# Patient Record
Sex: FEMALE | Race: WHITE | Employment: OTHER | URBAN - NONMETROPOLITAN AREA
[De-identification: names, ages, dates, MRNs, and addresses within clinical notes are randomized per-mention and may not be internally consistent; named-entity substitution may affect disease eponyms.]

---

## 2022-05-10 ENCOUNTER — OFFICE VISIT (OUTPATIENT)
Dept: CARDIOLOGY CLINIC | Age: 62
End: 2022-05-10
Payer: COMMERCIAL

## 2022-05-10 VITALS
HEIGHT: 64 IN | BODY MASS INDEX: 23.42 KG/M2 | SYSTOLIC BLOOD PRESSURE: 142 MMHG | DIASTOLIC BLOOD PRESSURE: 70 MMHG | HEART RATE: 80 BPM | WEIGHT: 137.2 LBS

## 2022-05-10 DIAGNOSIS — I10 PRIMARY HYPERTENSION: ICD-10-CM

## 2022-05-10 DIAGNOSIS — E78.01 FAMILIAL HYPERCHOLESTEROLEMIA: ICD-10-CM

## 2022-05-10 DIAGNOSIS — R06.02 SHORTNESS OF BREATH: Primary | ICD-10-CM

## 2022-05-10 PROCEDURE — 99204 OFFICE O/P NEW MOD 45 MIN: CPT | Performed by: NUCLEAR MEDICINE

## 2022-05-10 PROCEDURE — G8420 CALC BMI NORM PARAMETERS: HCPCS | Performed by: NUCLEAR MEDICINE

## 2022-05-10 PROCEDURE — G8427 DOCREV CUR MEDS BY ELIG CLIN: HCPCS | Performed by: NUCLEAR MEDICINE

## 2022-05-10 PROCEDURE — 3017F COLORECTAL CA SCREEN DOC REV: CPT | Performed by: NUCLEAR MEDICINE

## 2022-05-10 PROCEDURE — 4004F PT TOBACCO SCREEN RCVD TLK: CPT | Performed by: NUCLEAR MEDICINE

## 2022-05-10 RX ORDER — ROSUVASTATIN CALCIUM 20 MG/1
TABLET, COATED ORAL
COMMUNITY
Start: 2022-04-06

## 2022-05-10 RX ORDER — OMEGA-3-ACID ETHYL ESTERS 1 G/1
CAPSULE, LIQUID FILLED ORAL
COMMUNITY
Start: 2022-04-07

## 2022-05-10 RX ORDER — MELOXICAM 15 MG/1
TABLET ORAL
Status: ON HOLD | COMMUNITY
Start: 2022-04-06 | End: 2022-05-23 | Stop reason: HOSPADM

## 2022-05-10 RX ORDER — ASPIRIN 81 MG/1
TABLET ORAL
COMMUNITY

## 2022-05-10 RX ORDER — UBIDECARENONE 100 MG
CAPSULE ORAL
COMMUNITY

## 2022-05-10 ASSESSMENT — ENCOUNTER SYMPTOMS
CHEST TIGHTNESS: 0
SHORTNESS OF BREATH: 1
NAUSEA: 0
VOMITING: 0
ABDOMINAL DISTENTION: 0
COLOR CHANGE: 0
PHOTOPHOBIA: 0
ANAL BLEEDING: 0
ABDOMINAL PAIN: 0
BACK PAIN: 0
RECTAL PAIN: 0
BLOOD IN STOOL: 0
DIARRHEA: 0
CONSTIPATION: 0

## 2022-05-10 NOTE — PROGRESS NOTES
[address] [address] [address] [facility],4[address] [address] [facility]  Dept: [phone]  Dept Fax: [phone]  Loc: [phone]    Visit Date: 5/10/2022    Sherin Jimenes is a 59 y.o. female who presents todayfor:  Chief Complaint   Patient presents with    Hypertension    Hyperlipidemia    Shortness of Breath   here for the first time  Seen PCP   Was told had a murmur   Going for carotid disease surgery by Georgina Contreras  Had a stress echo   Was sub optimal   Some wall motion abnormality at baseline  No known CAD before  She is smoker  Limited by her likely COPD  Does have HTN and seems under control   Does have hyperlipidemia  On meds for a while  No chest pain per se  Some baseline dyspnea  Dyspnea on exertion   Still smoking  Family history of CAD       HPI:  HPI  History reviewed. No pertinent past medical history. History reviewed. No pertinent surgical history. History reviewed. No pertinent family history.   Social History     Tobacco Use    Smoking status: Current Some Day Smoker    Smokeless tobacco: Never Used   Substance Use Topics    Alcohol use: Not on file      Current Outpatient Medications   Medication Sig Dispense Refill    meloxicam (MOBIC) 15 MG tablet take 1 tablet by mouth once daily      aspirin 81 MG EC tablet Take by mouth      Calcium Carbonate-Vitamin D (CALCIUM-VITAMIN D) 600-125 MG-UNIT TABS Take by mouth      coenzyme Q10 100 MG CAPS capsule Take by mouth      VENTOLIN  (90 Base) MCG/ACT inhaler inhale 2 puffs by mouth and INTO THE LUNGS four times a day if needed for shortness of breath      metoprolol tartrate (LOPRESSOR) 25 MG tablet take 1 tablet by mouth twice a day      nicotine (NICODERM CQ) 7 MG/24HR APPLY 1 PATCH TO DRY, CLEAN, INTACT SKIN REMOVE AND REPLACE DAILY      omega-3 acid ethyl esters (LOVAZA) 1 g capsule take 2 capsules by mouth twice a day      rosuvastatin (CRESTOR) 20 MG tablet take 1 tablet by mouth once daily       No current facility-administered medications for this visit. No Known Allergies  Health Maintenance   Topic Date Due    Annual Wellness Visit (AWV)  Never done    Lipids  Never done    Depression Screen  Never done    HIV screen  Never done    Hepatitis C screen  Never done    DTaP/Tdap/Td vaccine (1 - Tdap) Never done    Cervical cancer screen  Never done    Colorectal Cancer Screen  Never done    Breast cancer screen  Never done    Shingles vaccine (1 of 2) Never done    COVID-19 Vaccine (3 - Booster for Pfizer series) 01/01/2022    Flu vaccine (Season Ended) 09/01/2022    Hepatitis A vaccine  Aged Out    Hepatitis B vaccine  Aged Out    Hib vaccine  Aged Out    Meningococcal (ACWY) vaccine  Aged Out    Pneumococcal 0-64 years Vaccine  Aged Out       Subjective:  Review of Systems   Constitutional: Positive for fatigue. HENT: Negative for ear pain and mouth sores. Eyes: Negative for photophobia. Respiratory: Positive for shortness of breath. Negative for chest tightness. Cardiovascular: Negative for chest pain and palpitations. Gastrointestinal: Negative for abdominal distention, abdominal pain, anal bleeding, blood in stool, constipation, diarrhea, nausea, rectal pain and vomiting. Endocrine: Negative for polyphagia. Genitourinary: Negative for dysuria, frequency and urgency. Musculoskeletal: Negative for arthralgias, back pain, gait problem, joint swelling, myalgias, neck pain and neck stiffness. Skin: Negative for color change, pallor, rash and wound. Allergic/Immunologic: Negative for food allergies. Neurological: Negative for dizziness, syncope and light-headedness. Psychiatric/Behavioral: Negative for confusion, decreased concentration, dysphoric mood and hallucinations. The patient is not nervous/anxious and is not hyperactive. Objective:  Physical Exam  HENT:      Head: Normocephalic and atraumatic.       Right Ear: Tympanic membrane normal.      Nose: Nose normal.      Mouth/Throat:      Mouth: Mucous membranes are dry. Eyes:      Extraocular Movements: Extraocular movements intact. Pupils: Pupils are equal, round, and reactive to light. Cardiovascular:      Rate and Rhythm: Normal rate and regular rhythm. Heart sounds: Murmur heard. No gallop. Pulmonary:      Effort: No respiratory distress. Breath sounds: No stridor. No wheezing, rhonchi or rales. Chest:      Chest wall: No tenderness. Abdominal:      General: There is no distension. Palpations: There is no mass. Tenderness: There is no abdominal tenderness. There is no right CVA tenderness, left CVA tenderness, guarding or rebound. Hernia: No hernia is present. Musculoskeletal:         General: No swelling, tenderness, deformity or signs of injury. Cervical back: Normal range of motion. Right lower leg: No edema. Left lower leg: No edema. Skin:     Coloration: Skin is not jaundiced or pale. Findings: No bruising, erythema, lesion or rash. Neurological:      Mental Status: She is alert and oriented to person, place, and time. Cranial Nerves: No cranial nerve deficit. Sensory: No sensory deficit. Motor: No weakness. Coordination: Coordination normal.      Gait: Gait normal.   Psychiatric:         Mood and Affect: Mood normal.         Thought Content: Thought content normal.       BP (!) 142/70   Pulse 80   Ht 5' 4\" (1.626 m)   Wt 137 lb 3.2 oz (62.2 kg)   BMI 23.55 kg/m²     Assessment:      Diagnosis Orders   1. Shortness of breath     2. Primary hypertension     3. Familial hypercholesterolemia     as above  Higher risk patient   Does have Carotid disease  Planning CEA soon       Plan:  No follow-ups on file. Discussed  Cath vs nuclear stress test discussed  Smoking: discussed with the patient the importance of smoke cessation especially with the risk of CAD.   Continue risk factor modification and medical management  Thank you for allowing me to participate in the care of your patient. Please don't hesitate to contact me regarding any further issues related to the patient care    Orders Placed:  No orders of the defined types were placed in this encounter. Medications Prescribed:  No orders of the defined types were placed in this encounter. Discussed use, benefit, and side effects of prescribed medications. All patient questions answered. Pt voicedunderstanding. Instructed to continue current medications, diet and exercise. Continue risk factor modification and medical management. Patient agreed with treatment plan. Follow up as directed.     Electronically signedby Armida Santacruz MD on 5/10/2022 at 1:16 PM

## 2022-05-11 ENCOUNTER — TELEPHONE (OUTPATIENT)
Dept: CARDIOLOGY CLINIC | Age: 62
End: 2022-05-11

## 2022-05-11 NOTE — TELEPHONE ENCOUNTER
Spoke with patient to schedule left heart cath on 5/23/22 at 2:00 pm, with arrival time of 12:00 pm; patient may take all medications as usual on day of procedure. All instructions reviewed via phone with patient and also mailed to patient this date. Patient verbalized understanding of all instructions. Case scheduled with Karthik Arrieta in cath lab.

## 2022-05-18 NOTE — PRE-PROCEDURE INSTRUCTIONS
[facility]nd [facility]  NPO after midnight  Bring drivers license and insurance information  Wear comfortable clean clothes  Shower morning of and night before with liquid antibacterial soap  Remove jewelry   May have to stay overnight if have PTCA/stent - bring small overnight bag  Bring medications in original bottles - may take am meds with small amount of water. Made aware of visitors limit to 2 at a time  Follow all instructions given by your physician  Please notify doctor office if you need to cancel or reschedule your procedure   needed at discharge  Bring and wear your mask.   Leave valuables at home

## 2022-05-20 ENCOUNTER — PREP FOR PROCEDURE (OUTPATIENT)
Dept: CARDIOLOGY | Age: 62
End: 2022-05-20

## 2022-05-20 RX ORDER — DIPHENHYDRAMINE HYDROCHLORIDE 50 MG/ML
50 INJECTION INTRAMUSCULAR; INTRAVENOUS ONCE
Status: CANCELLED | OUTPATIENT
Start: 2022-05-20 | End: 2022-05-20

## 2022-05-20 RX ORDER — NITROGLYCERIN 0.4 MG/1
0.4 TABLET SUBLINGUAL EVERY 5 MIN PRN
Status: CANCELLED | OUTPATIENT
Start: 2022-05-20

## 2022-05-20 RX ORDER — SODIUM CHLORIDE 9 MG/ML
INJECTION, SOLUTION INTRAVENOUS PRN
Status: CANCELLED | OUTPATIENT
Start: 2022-05-20

## 2022-05-20 RX ORDER — ASPIRIN 325 MG
325 TABLET ORAL ONCE
Status: CANCELLED | OUTPATIENT
Start: 2022-05-20 | End: 2022-05-20

## 2022-05-20 RX ORDER — SODIUM CHLORIDE 0.9 % (FLUSH) 0.9 %
5-40 SYRINGE (ML) INJECTION PRN
Status: CANCELLED | OUTPATIENT
Start: 2022-05-20

## 2022-05-20 RX ORDER — SODIUM CHLORIDE 0.9 % (FLUSH) 0.9 %
5-40 SYRINGE (ML) INJECTION EVERY 12 HOURS SCHEDULED
Status: CANCELLED | OUTPATIENT
Start: 2022-05-20

## 2022-05-23 ENCOUNTER — TELEPHONE (OUTPATIENT)
Dept: CARDIOLOGY CLINIC | Age: 62
End: 2022-05-23

## 2022-05-23 ENCOUNTER — HOSPITAL ENCOUNTER (OUTPATIENT)
Dept: INPATIENT UNIT | Age: 62
Discharge: HOME | End: 2022-05-23
Attending: NUCLEAR MEDICINE | Admitting: NUCLEAR MEDICINE
Payer: COMMERCIAL

## 2022-05-23 VITALS
HEIGHT: 64 IN | DIASTOLIC BLOOD PRESSURE: 61 MMHG | RESPIRATION RATE: 16 BRPM | SYSTOLIC BLOOD PRESSURE: 138 MMHG | OXYGEN SATURATION: 95 % | WEIGHT: 132 LBS | TEMPERATURE: 97.5 F | HEART RATE: 73 BPM | BODY MASS INDEX: 22.53 KG/M2

## 2022-05-23 LAB
ABO: NORMAL
ACTIVATED CLOTTING TIME: 291 SECONDS (ref 1–150)
ANION GAP SERPL CALCULATED.3IONS-SCNC: 13 MEQ/L (ref 8–16)
ANTIBODY SCREEN: NORMAL
APTT: 37.9 SECONDS (ref 22–38)
BUN BLDV-MCNC: 14 MG/DL (ref 7–22)
CALCIUM SERPL-MCNC: 10 MG/DL (ref 8.5–10.5)
CHLORIDE BLD-SCNC: 101 MEQ/L (ref 98–111)
CO2: 26 MEQ/L (ref 23–33)
CREAT SERPL-MCNC: 0.6 MG/DL (ref 0.4–1.2)
EKG ATRIAL RATE: 63 BPM
EKG ATRIAL RATE: 67 BPM
EKG P AXIS: 68 DEGREES
EKG P AXIS: 71 DEGREES
EKG P-R INTERVAL: 176 MS
EKG P-R INTERVAL: 178 MS
EKG Q-T INTERVAL: 434 MS
EKG Q-T INTERVAL: 486 MS
EKG QRS DURATION: 84 MS
EKG QRS DURATION: 90 MS
EKG QTC CALCULATION (BAZETT): 458 MS
EKG QTC CALCULATION (BAZETT): 497 MS
EKG R AXIS: 51 DEGREES
EKG R AXIS: 69 DEGREES
EKG T AXIS: 61 DEGREES
EKG T AXIS: 66 DEGREES
EKG VENTRICULAR RATE: 63 BPM
EKG VENTRICULAR RATE: 67 BPM
ERYTHROCYTE [DISTWIDTH] IN BLOOD BY AUTOMATED COUNT: 13.6 % (ref 11.5–14.5)
ERYTHROCYTE [DISTWIDTH] IN BLOOD BY AUTOMATED COUNT: 46.5 FL (ref 35–45)
GFR SERPL CREATININE-BSD FRML MDRD: > 90 ML/MIN/1.73M2
GLUCOSE BLD-MCNC: 94 MG/DL (ref 70–108)
HCT VFR BLD CALC: 45.2 % (ref 37–47)
HEMOGLOBIN: 14.4 GM/DL (ref 12–16)
INR BLD: 0.98 (ref 0.85–1.13)
MCH RBC QN AUTO: 29.3 PG (ref 26–33)
MCHC RBC AUTO-ENTMCNC: 31.9 GM/DL (ref 32.2–35.5)
MCV RBC AUTO: 92.1 FL (ref 81–99)
PLATELET # BLD: 212 THOU/MM3 (ref 130–400)
PMV BLD AUTO: 10.8 FL (ref 9.4–12.4)
POTASSIUM SERPL-SCNC: 4.2 MEQ/L (ref 3.5–5.2)
PREGNANCY, SERUM: NEGATIVE
RBC # BLD: 4.91 MILL/MM3 (ref 4.2–5.4)
RH FACTOR: NORMAL
SODIUM BLD-SCNC: 140 MEQ/L (ref 135–145)
WBC # BLD: 11.2 THOU/MM3 (ref 4.8–10.8)

## 2022-05-23 PROCEDURE — 2500000003 HC RX 250 WO HCPCS

## 2022-05-23 PROCEDURE — 93005 ELECTROCARDIOGRAM TRACING: CPT | Performed by: INTERNAL MEDICINE

## 2022-05-23 PROCEDURE — 93005 ELECTROCARDIOGRAM TRACING: CPT | Performed by: STUDENT IN AN ORGANIZED HEALTH CARE EDUCATION/TRAINING PROGRAM

## 2022-05-23 PROCEDURE — 85730 THROMBOPLASTIN TIME PARTIAL: CPT

## 2022-05-23 PROCEDURE — 85347 COAGULATION TIME ACTIVATED: CPT

## 2022-05-23 PROCEDURE — 86850 RBC ANTIBODY SCREEN: CPT

## 2022-05-23 PROCEDURE — 86901 BLOOD TYPING SEROLOGIC RH(D): CPT

## 2022-05-23 PROCEDURE — 93458 L HRT ARTERY/VENTRICLE ANGIO: CPT | Performed by: INTERNAL MEDICINE

## 2022-05-23 PROCEDURE — 84703 CHORIONIC GONADOTROPIN ASSAY: CPT

## 2022-05-23 PROCEDURE — C1887 CATHETER, GUIDING: HCPCS

## 2022-05-23 PROCEDURE — 85027 COMPLETE CBC AUTOMATED: CPT

## 2022-05-23 PROCEDURE — 6360000004 HC RX CONTRAST MEDICATION: Performed by: NUCLEAR MEDICINE

## 2022-05-23 PROCEDURE — 86900 BLOOD TYPING SEROLOGIC ABO: CPT

## 2022-05-23 PROCEDURE — 93458 L HRT ARTERY/VENTRICLE ANGIO: CPT

## 2022-05-23 PROCEDURE — 36415 COLL VENOUS BLD VENIPUNCTURE: CPT

## 2022-05-23 PROCEDURE — 93571 IV DOP VEL&/PRESS C FLO 1ST: CPT

## 2022-05-23 PROCEDURE — C1769 GUIDE WIRE: HCPCS

## 2022-05-23 PROCEDURE — 6360000002 HC RX W HCPCS

## 2022-05-23 PROCEDURE — 80048 BASIC METABOLIC PNL TOTAL CA: CPT

## 2022-05-23 PROCEDURE — C1894 INTRO/SHEATH, NON-LASER: HCPCS

## 2022-05-23 PROCEDURE — 93571 IV DOP VEL&/PRESS C FLO 1ST: CPT | Performed by: INTERNAL MEDICINE

## 2022-05-23 PROCEDURE — 85610 PROTHROMBIN TIME: CPT

## 2022-05-23 PROCEDURE — 93010 ELECTROCARDIOGRAM REPORT: CPT | Performed by: INTERNAL MEDICINE

## 2022-05-23 PROCEDURE — 6370000000 HC RX 637 (ALT 250 FOR IP): Performed by: INTERNAL MEDICINE

## 2022-05-23 PROCEDURE — 93799 UNLISTED CV SVC/PROCEDURE: CPT | Performed by: NUCLEAR MEDICINE

## 2022-05-23 RX ORDER — ACETAMINOPHEN 325 MG/1
650 TABLET ORAL EVERY 4 HOURS PRN
Status: DISCONTINUED | OUTPATIENT
Start: 2022-05-23 | End: 2022-05-23 | Stop reason: HOSPADM

## 2022-05-23 RX ORDER — SODIUM CHLORIDE 9 MG/ML
INJECTION, SOLUTION INTRAVENOUS PRN
Status: DISCONTINUED | OUTPATIENT
Start: 2022-05-23 | End: 2022-05-23 | Stop reason: HOSPADM

## 2022-05-23 RX ORDER — SODIUM CHLORIDE 0.9 % (FLUSH) 0.9 %
5-40 SYRINGE (ML) INJECTION EVERY 12 HOURS SCHEDULED
Status: DISCONTINUED | OUTPATIENT
Start: 2022-05-23 | End: 2022-05-23 | Stop reason: HOSPADM

## 2022-05-23 RX ORDER — ASPIRIN 325 MG
325 TABLET ORAL ONCE
Status: DISCONTINUED | OUTPATIENT
Start: 2022-05-23 | End: 2022-05-23 | Stop reason: HOSPADM

## 2022-05-23 RX ORDER — SODIUM CHLORIDE 9 MG/ML
INJECTION, SOLUTION INTRAVENOUS PRN
Status: DISCONTINUED | OUTPATIENT
Start: 2022-05-23 | End: 2022-05-23 | Stop reason: SDUPTHER

## 2022-05-23 RX ORDER — DIPHENHYDRAMINE HYDROCHLORIDE 50 MG/ML
50 INJECTION INTRAMUSCULAR; INTRAVENOUS ONCE
Status: DISCONTINUED | OUTPATIENT
Start: 2022-05-23 | End: 2022-05-23 | Stop reason: HOSPADM

## 2022-05-23 RX ORDER — SODIUM CHLORIDE 0.9 % (FLUSH) 0.9 %
5-40 SYRINGE (ML) INJECTION PRN
Status: DISCONTINUED | OUTPATIENT
Start: 2022-05-23 | End: 2022-05-23 | Stop reason: HOSPADM

## 2022-05-23 RX ORDER — SODIUM CHLORIDE 0.9 % (FLUSH) 0.9 %
5-40 SYRINGE (ML) INJECTION EVERY 12 HOURS SCHEDULED
Status: DISCONTINUED | OUTPATIENT
Start: 2022-05-23 | End: 2022-05-23 | Stop reason: SDUPTHER

## 2022-05-23 RX ORDER — SODIUM CHLORIDE 0.9 % (FLUSH) 0.9 %
5-40 SYRINGE (ML) INJECTION PRN
Status: DISCONTINUED | OUTPATIENT
Start: 2022-05-23 | End: 2022-05-23 | Stop reason: SDUPTHER

## 2022-05-23 RX ORDER — NITROGLYCERIN 0.4 MG/1
0.4 TABLET SUBLINGUAL EVERY 5 MIN PRN
Status: DISCONTINUED | OUTPATIENT
Start: 2022-05-23 | End: 2022-05-23 | Stop reason: HOSPADM

## 2022-05-23 RX ADMIN — ACETAMINOPHEN 650 MG: 325 TABLET ORAL at 17:28

## 2022-05-23 RX ADMIN — IOPAMIDOL 125 ML: 755 INJECTION, SOLUTION INTRAVENOUS at 14:37

## 2022-05-23 ASSESSMENT — PAIN DESCRIPTION - DESCRIPTORS: DESCRIPTORS: ACHING

## 2022-05-23 ASSESSMENT — PAIN SCALES - GENERAL: PAINLEVEL_OUTOF10: 5

## 2022-05-23 ASSESSMENT — PAIN DESCRIPTION - LOCATION: LOCATION: WRIST

## 2022-05-23 ASSESSMENT — PAIN DESCRIPTION - ORIENTATION: ORIENTATION: RIGHT

## 2022-05-23 NOTE — BRIEF OP NOTE
[address] [address]  Sedation/Analgesia Post Sedation Record    Pt Name: Rosa Larsen  Account number: [de-identified]  MRN: [id number]  YOB: 1960  Procedure Performed By: Diana Jason MD MD   Primary Care Physician: Iris Arango MD  Date: 5/23/2022    POST-PROCEDURE    Physicians/Assistants: Diana Jason MD MD     Procedure Performed: FFR of LAD     Sedation/Anesthesia: Versed/ Fentanyl and 2% xylocaine local anesthesia. Estimated Blood Loss: < 50 ml. Specimens Removed: None         Disposition of Specimen: N/A        Complications: No Immediate Complications. Post-procedure Diagnosis/Findings:        Fractional flow reserved measurement of LAD (Negative, 0.84)    Severe LCX stenosis   RCA       Recommendations:  Bed rest for the next 4 hours  Access site care  Aggressive risk factor modification for CAD  ASA 81 mg PO daily   Statin therapy  Discharge home today   She is scheduled for CEA with Dr Angelina Bob. Patient will follow up with Dr Eddie Montez, timing of PCI LCX is pending discussion with vascular surgery      Above findings and plan of care were discussed with patient, questions were answered, agreeable with plan.        Diana Jason MD, Susan Rangel   Electronically signed 5/23/2022 at 2:36 PM  Interventional Cardiology

## 2022-05-23 NOTE — TELEPHONE ENCOUNTER
PCI scheduled with Dr. Adiel Wang per [facility] in cath lab for 6/06/22 at 7:00 am. Patient sees Dr. Santos Mccartney and needs stenting. Scheduling form filled out and faxed to cath lab, instructions for upcoming cath mailed to patient this date. Copy given to [employer] for prior auth.

## 2022-05-23 NOTE — H&P
OWEN    sodium chloride flush 0.9 % injection 5-40 mL, 5-40 mL, IntraVENous, 2 times per day, Eugene Barron PA-C    sodium chloride flush 0.9 % injection 5-40 mL, 5-40 mL, IntraVENous, PRN, Eugene Barron PA-C  Prior to Admission medications    Medication Sig Start Date End Date Taking?  Authorizing Provider   meloxicam (MOBIC) 15 MG tablet take 1 tablet by mouth once daily 4/6/22   Historical Provider, MD   aspirin 81 MG EC tablet Take by mouth    Historical Provider, MD   Calcium Carbonate-Vitamin D (CALCIUM-VITAMIN D) 600-125 MG-UNIT TABS Take by mouth    Historical Provider, MD   coenzyme Q10 100 MG CAPS capsule Take by mouth    Historical Provider, MD   VENTOLIN  (90 Base) MCG/ACT inhaler inhale 2 puffs by mouth and INTO THE LUNGS four times a day if needed for shortness of breath 4/27/22   Historical Provider, MD   metoprolol tartrate (LOPRESSOR) 25 MG tablet take 1 tablet by mouth twice a day 4/27/22   Historical Provider, MD   nicotine (NICODERM CQ) 7 MG/24HR APPLY 1 PATCH TO DRY, CLEAN, INTACT SKIN REMOVE AND REPLACE DAILY 2/21/22   Historical Provider, MD   omega-3 acid ethyl esters (LOVAZA) 1 g capsule take 2 capsules by mouth twice a day 4/7/22   Historical Provider, MD   rosuvastatin (CRESTOR) 20 MG tablet take 1 tablet by mouth once daily 4/6/22   Historical Provider, MD     Additional information:       VITAL SIGNS   Vitals:    05/23/22 1220   BP: (!) 150/66   Pulse: 69   Resp: 23   Temp: 97.5 °F (36.4 °C)   SpO2: 97%       PHYSICAL:   General: No acute distress  HEENT:  Unremarkable for age  Neck: without increased JVD, carotid pulses 2+ bilaterally without bruits  Heart: RRR, S1 & S2 WNL, S4 gallop, without murmurs or rubs    Lungs: Clear to auscultation    Abdomen: BS present, without HSM, masses, or tenderness    Extremities: without C,C,E.  Pulses 2+ bilaterally  Mental Status: Alert & Oriented        PLANNED PROCEDURE   [x]Cath  []PCI                []Pacemaker/AICD  []DALILA []Cardioversion []Peripheral angiography/PTA  []Other:      SEDATION  Planned agent:[x]Midazolam []Meperidine [x]Sublimaze []Morphine  []Diazepam  []Other:     ASA Classification:  []1 [x]2 []3 []4 []5  Class 1: A normal healthy patient  Class 2: Pt with mild to moderate systemic disease  Class 3: Severe systemic disease or disturbance  Class 4: Severe systemic disorders that are already life threatening. Class 5: Moribund pt with little chances of survival, for more than 24 hours. Mallampati I Airway Classification:   []1 [x]2 []3 []4    [x]Pre-procedure diagnostic studies complete and results available. Comment:    [x]Previous sedation/anesthesia experiences assessed. Comment:    [x]The patient is an appropriate candidate to undergo the planned procedure sedation and anesthesia. (Refer to nursing sedation/analgesia documentation record)  [x]Formulation and discussion of sedation/procedure plan, risks, and expectations with patient and/or responsible adult completed. [x]Patient examined immediately prior to the procedure.  (Refer to nursing sedation/analgesia documentation record)    Summer Cam MD MD [facility]  Electronically signed 5/23/2022 at 12:40 PM

## 2022-05-23 NOTE — PROCEDURES
[address] [id number]                            CARDIAC CATHETERIZATION    PATIENT NAME: Jessica Moran                       :        1960  MED REC NO:   [id number]                           ROOM:       0016  ACCOUNT NO:   [de-identified]                           ADMIT DATE: 2022  PROVIDER:     VERNELL Zayass:  2022    CLINICAL HISTORY AND INDICATION:  This is a pleasant lady with abnormal  stress test, multiple risk factors for coronary artery disease with  symptoms of dyspnea and significant peripheral vascular disease being  evaluated for carotid endarterectomy. PROCEDURES:  1. Left heart cath with left ventriculogram.  2.  Coronary angiogram, right and left. 3.  Sedation:  Total of 2 of Versed, 25 of fentanyl between 02:00 and  02:30 p.m. in my presence under my supervision. 4.  Blood loss less than 10 mL. PROCEDURE DETAILS:  Please refer to my catheterization detailed note. HEMODYNAMIC RESULTS AND LEFT VENTRICULOGRAM:  Left ventricular  end-diastolic pressure was 12 mmHg with no significant change before and  after contrast injection. No significant gradient across the aortic  valve to signify aortic stenosis. Left ventricular function was within  lower limits of normal with inferior basal hypokinesis. EF 50%. CORONARY ARTERIOGRAM RESULTS:  1. Left main is patent, gives rise to left anterior descending and left  circumflex artery. 2.  Left anterior descending artery has diffuse calcific and  nonobstructive disease in the mid segment, could be 50% to 60% with  involvement of the diagonal artery. 3.  Left circumflex artery has ostial 50% stenosis with proximal 90%  stenosis. 4.  Right coronary artery is totally occluded and is dominant. CONCLUSION:  1.  Multivessel coronary artery disease as described above. 2.  Mild LV dysfunction.   3.  No complications. RECOMMENDATION:  At this point, this is a very complicated case. The  patient has significant carotid artery disease, is being evaluated for  surgical intervention by Dr. Anthony Acharya. She has a moderate lesion in the  LAD, severe disease in the RCA and left circ. I had an extensive  discussion with the patient's family as well as with interventional  team, Dr. Cherlyn Hodgkin. Plan was to proceed with fractional flow reserve of  the LAD and then proceed accordingly. Consideration of  revascularization either way by percutaneous intervention or bypass  surgery will be discussed with the patient and we will discuss the  findings with Dr. Anthony Acharya as well.         Macy Escalona M.D.    D: 05/23/2022 14:36:50       T: 05/23/2022 14:39:53     [email or web address]  Job#: [id number]     Doc#: [id number]    CC:

## 2022-05-23 NOTE — FLOWSHEET NOTE
1200 Patient admitted to 2E16  Ambulatory for left heart catherization  Patient NPO. Patient accompanied by daughter, Erika Corporal  Vital signs obtained. Assessment and data collection intiated. Oriented to room. Policies and procedures for 2E explained. All questions answered with no further questions at this time. Fall precautions reviewed with patient. Mckenzie 88 reviewed with patient and daughter. Patient and daughter verbalize understanding of the plan of care and contribute to goal setting. 1320  to cath lab per bed, stable condition. 1445 care taken over from cath lab. Returned to 2E16. Monitor attached showing SR. Vasc-band in place to right wrist.  Hemostasis maintained. No bleeding, swelling, or edema noted. 0.9NSS infusing with approx 500 ml remaining. 1845 up in room, tolerated activity well. 9819 Patient goals/plan/treatment preferences discussed by this RN. Discharge planning provided by this RN. Patient goals/plan/treatment preferences reviewed with patient/family. Patient/family verbalize understanding of discharge plan and are in agreement with goal/plan/treatment preferences. Understanding was demonstrated using the teach back method. AVS provided by this RN at time of discharge, which includes all necessary medical information pertaining to the patients current course of illness, treatment, post-discharge goals of care, and treatment preferences. Dis  charge instructions given. 1930Discharged per wheelchair, stable condition.

## 2022-05-23 NOTE — PLAN OF CARE
Problem: Discharge Planning  Goal: Discharge to home or other facility with appropriate resources  Outcome: Completed  Flowsheets  Taken 5/23/2022 1255  Discharge to home or other facility with appropriate resources:   Identify barriers to discharge with patient and caregiver   Arrange for needed discharge resources and transportation as appropriate   Identify discharge learning needs (meds, wound care, etc)  Taken 5/23/2022 1200  Discharge to home or other facility with appropriate resources:   Identify barriers to discharge with patient and caregiver   Arrange for needed discharge resources and transportation as appropriate   Identify discharge learning needs (meds, wound care, etc)

## 2022-05-23 NOTE — PROCEDURES
[address] [place] [address]                            CARDIAC CATHETERIZATION    PATIENT NAME: Brianda Aragon                       :        1960  MED REC NO:   [id number]                           ROOM:       0016  ACCOUNT NO:   [de-identified]                           ADMIT DATE: 2022  PROVIDER:     Rupert Lopez MD    DATE OF PROCEDURE:  2022    CARDIAC CATHETERIZATION AND FRACTIONAL FLOW RESERVE MEASUREMENT REPORT    INDICATION:  This is a pleasant 70-year-old female patient with known  history of peripheral arterial disease. She has known history of severe  carotid artery stenosis. The patient was evaluated by her primary  cardiologist, Dr. Caroline Martinez in the office for angina and angina  equivalent symptoms. Preoperative cardiac risk assessment was ongoing. Abnormal stress test.  The patient underwent cardiac catheterization by  Dr. Caroline Martinez today. FFR of LAD was requested. PROCEDURE PERFORMED:  Fractional flow reserve measurement of the left  anterior descending artery. DESCRIPTION OF PROCEDURE/DETAILS:  After informed consent, we proceeded  with FFR measurement of LAD. The patient has undergone diagnostic  cardiac catheterization by Dr. Caroline Martinez today. Please refer to note  for further details. In summary, she was found to have chronic total  occlusion of the RCA, which seems to be the smaller vessel with  left-to-right collaterals. Severe left circumflex artery stenosis was  noticed with intermediate mid LAD stenosis. Heparin was given. ACT was  confirmed to be above 250. A 6-Moldovan EBU 3.5 guide catheter was  initially used to attempt to engage the left main. There was difficulty  engaging. I exchanged the guide catheter to 6-Moldovan JL3.5 guide  catheter, which was used to successfully engage the left main coronary  artery.   FFR wire was prepared outside the patient's body per  's

## 2022-05-23 NOTE — TELEPHONE ENCOUNTER
2E RN, Yvonne called stating patient needs scheduled for PCI. From [facility] Post Op Note:    · She is scheduled for CEA with Dr Josue Borrego. Patient will follow up with Dr Patricia Ayers, timing of PCI LCX is pending discussion with vascular surgery     Please verify plan to proceed with PCI.

## 2022-06-03 ENCOUNTER — PREP FOR PROCEDURE (OUTPATIENT)
Dept: CARDIOLOGY | Age: 62
End: 2022-06-03

## 2022-06-03 RX ORDER — SODIUM CHLORIDE 9 MG/ML
INJECTION, SOLUTION INTRAVENOUS PRN
Status: CANCELLED | OUTPATIENT
Start: 2022-06-03

## 2022-06-03 RX ORDER — SODIUM CHLORIDE 0.9 % (FLUSH) 0.9 %
5-40 SYRINGE (ML) INJECTION EVERY 12 HOURS SCHEDULED
Status: CANCELLED | OUTPATIENT
Start: 2022-06-03

## 2022-06-03 RX ORDER — NITROGLYCERIN 0.4 MG/1
0.4 TABLET SUBLINGUAL EVERY 5 MIN PRN
Status: CANCELLED | OUTPATIENT
Start: 2022-06-03

## 2022-06-03 RX ORDER — ASPIRIN 325 MG
325 TABLET ORAL ONCE
Status: CANCELLED | OUTPATIENT
Start: 2022-06-03 | End: 2022-06-03

## 2022-06-03 RX ORDER — SODIUM CHLORIDE 9 MG/ML
INJECTION, SOLUTION INTRAVENOUS CONTINUOUS
Status: CANCELLED | OUTPATIENT
Start: 2022-06-03

## 2022-06-03 RX ORDER — SODIUM CHLORIDE 0.9 % (FLUSH) 0.9 %
5-40 SYRINGE (ML) INJECTION PRN
Status: CANCELLED | OUTPATIENT
Start: 2022-06-03

## 2022-06-06 ENCOUNTER — HOSPITAL ENCOUNTER (OUTPATIENT)
Dept: INPATIENT UNIT | Age: 62
Discharge: HOME | End: 2022-06-07
Attending: INTERNAL MEDICINE | Admitting: INTERNAL MEDICINE
Payer: COMMERCIAL

## 2022-06-06 LAB
ABO: NORMAL
ACTIVATED CLOTTING TIME: 306 SECONDS (ref 1–150)
ANION GAP SERPL CALCULATED.3IONS-SCNC: 11 MEQ/L (ref 8–16)
ANTIBODY SCREEN: NORMAL
APTT: 37.1 SECONDS (ref 22–38)
BUN BLDV-MCNC: 13 MG/DL (ref 7–22)
CALCIUM SERPL-MCNC: 10.4 MG/DL (ref 8.5–10.5)
CHLORIDE BLD-SCNC: 103 MEQ/L (ref 98–111)
CHOLESTEROL, TOTAL: 160 MG/DL (ref 100–199)
CO2: 27 MEQ/L (ref 23–33)
CREAT SERPL-MCNC: 0.7 MG/DL (ref 0.4–1.2)
EKG ATRIAL RATE: 67 BPM
EKG ATRIAL RATE: 67 BPM
EKG P AXIS: 72 DEGREES
EKG P AXIS: 91 DEGREES
EKG P-R INTERVAL: 176 MS
EKG P-R INTERVAL: 186 MS
EKG Q-T INTERVAL: 432 MS
EKG Q-T INTERVAL: 446 MS
EKG QRS DURATION: 86 MS
EKG QRS DURATION: 88 MS
EKG QTC CALCULATION (BAZETT): 456 MS
EKG QTC CALCULATION (BAZETT): 471 MS
EKG R AXIS: 43 DEGREES
EKG R AXIS: 80 DEGREES
EKG T AXIS: 61 DEGREES
EKG T AXIS: 71 DEGREES
EKG VENTRICULAR RATE: 67 BPM
EKG VENTRICULAR RATE: 67 BPM
ERYTHROCYTE [DISTWIDTH] IN BLOOD BY AUTOMATED COUNT: 13.7 % (ref 11.5–14.5)
ERYTHROCYTE [DISTWIDTH] IN BLOOD BY AUTOMATED COUNT: 46.7 FL (ref 35–45)
GFR SERPL CREATININE-BSD FRML MDRD: 84 ML/MIN/1.73M2
GLUCOSE BLD-MCNC: 84 MG/DL (ref 70–108)
HCT VFR BLD CALC: 47.2 % (ref 37–47)
HDLC SERPL-MCNC: 53 MG/DL
HEMOGLOBIN: 15.3 GM/DL (ref 12–16)
INR BLD: 1 (ref 0.85–1.13)
LDL CHOLESTEROL CALCULATED: 83 MG/DL
MCH RBC QN AUTO: 29.7 PG (ref 26–33)
MCHC RBC AUTO-ENTMCNC: 32.4 GM/DL (ref 32.2–35.5)
MCV RBC AUTO: 91.7 FL (ref 81–99)
PLATELET # BLD: 229 THOU/MM3 (ref 130–400)
PMV BLD AUTO: 11.3 FL (ref 9.4–12.4)
POTASSIUM SERPL-SCNC: 4 MEQ/L (ref 3.5–5.2)
RBC # BLD: 5.15 MILL/MM3 (ref 4.2–5.4)
RH FACTOR: NORMAL
SODIUM BLD-SCNC: 141 MEQ/L (ref 135–145)
TRIGL SERPL-MCNC: 118 MG/DL (ref 0–199)
WBC # BLD: 10.4 THOU/MM3 (ref 4.8–10.8)

## 2022-06-06 PROCEDURE — 85027 COMPLETE CBC AUTOMATED: CPT

## 2022-06-06 PROCEDURE — 86901 BLOOD TYPING SEROLOGIC RH(D): CPT

## 2022-06-06 PROCEDURE — 85347 COAGULATION TIME ACTIVATED: CPT

## 2022-06-06 PROCEDURE — 2500000003 HC RX 250 WO HCPCS

## 2022-06-06 PROCEDURE — 85730 THROMBOPLASTIN TIME PARTIAL: CPT

## 2022-06-06 PROCEDURE — 93005 ELECTROCARDIOGRAM TRACING: CPT | Performed by: INTERNAL MEDICINE

## 2022-06-06 PROCEDURE — C1769 GUIDE WIRE: HCPCS

## 2022-06-06 PROCEDURE — C1874 STENT, COATED/COV W/DEL SYS: HCPCS

## 2022-06-06 PROCEDURE — 86850 RBC ANTIBODY SCREEN: CPT

## 2022-06-06 PROCEDURE — 93010 ELECTROCARDIOGRAM REPORT: CPT | Performed by: INTERNAL MEDICINE

## 2022-06-06 PROCEDURE — C1894 INTRO/SHEATH, NON-LASER: HCPCS

## 2022-06-06 PROCEDURE — 6370000000 HC RX 637 (ALT 250 FOR IP)

## 2022-06-06 PROCEDURE — C1887 CATHETER, GUIDING: HCPCS

## 2022-06-06 PROCEDURE — 6370000000 HC RX 637 (ALT 250 FOR IP): Performed by: INTERNAL MEDICINE

## 2022-06-06 PROCEDURE — 36415 COLL VENOUS BLD VENIPUNCTURE: CPT

## 2022-06-06 PROCEDURE — 80061 LIPID PANEL: CPT

## 2022-06-06 PROCEDURE — 80048 BASIC METABOLIC PNL TOTAL CA: CPT

## 2022-06-06 PROCEDURE — 86900 BLOOD TYPING SEROLOGIC ABO: CPT

## 2022-06-06 PROCEDURE — 2580000003 HC RX 258: Performed by: INTERNAL MEDICINE

## 2022-06-06 PROCEDURE — 93005 ELECTROCARDIOGRAM TRACING: CPT | Performed by: PHYSICIAN ASSISTANT

## 2022-06-06 PROCEDURE — 85610 PROTHROMBIN TIME: CPT

## 2022-06-06 PROCEDURE — 6360000004 HC RX CONTRAST MEDICATION: Performed by: INTERNAL MEDICINE

## 2022-06-06 PROCEDURE — 2580000003 HC RX 258: Performed by: PHYSICIAN ASSISTANT

## 2022-06-06 PROCEDURE — 92928 PRQ TCAT PLMT NTRAC ST 1 LES: CPT | Performed by: INTERNAL MEDICINE

## 2022-06-06 PROCEDURE — 6360000002 HC RX W HCPCS

## 2022-06-06 PROCEDURE — C9600 PERC DRUG-EL COR STENT SING: HCPCS

## 2022-06-06 PROCEDURE — C1725 CATH, TRANSLUMIN NON-LASER: HCPCS

## 2022-06-06 RX ORDER — SODIUM CHLORIDE 9 MG/ML
INJECTION, SOLUTION INTRAVENOUS CONTINUOUS
Status: DISCONTINUED | OUTPATIENT
Start: 2022-06-06 | End: 2022-06-07 | Stop reason: HOSPADM

## 2022-06-06 RX ORDER — ROSUVASTATIN CALCIUM 20 MG/1
20 TABLET, COATED ORAL NIGHTLY
Status: DISCONTINUED | OUTPATIENT
Start: 2022-06-06 | End: 2022-06-07 | Stop reason: HOSPADM

## 2022-06-06 RX ORDER — CLOPIDOGREL BISULFATE 75 MG/1
75 TABLET ORAL DAILY
Status: DISCONTINUED | OUTPATIENT
Start: 2022-06-07 | End: 2022-06-07 | Stop reason: HOSPADM

## 2022-06-06 RX ORDER — ASPIRIN 81 MG/1
81 TABLET ORAL DAILY
Status: DISCONTINUED | OUTPATIENT
Start: 2022-06-06 | End: 2022-06-07 | Stop reason: HOSPADM

## 2022-06-06 RX ORDER — SODIUM CHLORIDE 0.9 % (FLUSH) 0.9 %
5-40 SYRINGE (ML) INJECTION PRN
Status: DISCONTINUED | OUTPATIENT
Start: 2022-06-06 | End: 2022-06-07 | Stop reason: HOSPADM

## 2022-06-06 RX ORDER — SODIUM CHLORIDE 0.9 % (FLUSH) 0.9 %
5-40 SYRINGE (ML) INJECTION EVERY 12 HOURS SCHEDULED
Status: DISCONTINUED | OUTPATIENT
Start: 2022-06-06 | End: 2022-06-07 | Stop reason: HOSPADM

## 2022-06-06 RX ORDER — SODIUM CHLORIDE 9 MG/ML
INJECTION, SOLUTION INTRAVENOUS PRN
Status: DISCONTINUED | OUTPATIENT
Start: 2022-06-06 | End: 2022-06-07 | Stop reason: HOSPADM

## 2022-06-06 RX ORDER — NITROGLYCERIN 0.4 MG/1
0.4 TABLET SUBLINGUAL EVERY 5 MIN PRN
Status: DISCONTINUED | OUTPATIENT
Start: 2022-06-06 | End: 2022-06-07 | Stop reason: HOSPADM

## 2022-06-06 RX ORDER — ACETAMINOPHEN 325 MG/1
650 TABLET ORAL EVERY 4 HOURS PRN
Status: DISCONTINUED | OUTPATIENT
Start: 2022-06-06 | End: 2022-06-07 | Stop reason: HOSPADM

## 2022-06-06 RX ORDER — MELOXICAM 15 MG/1
15 TABLET ORAL DAILY
Status: ON HOLD | COMMUNITY
End: 2022-06-06

## 2022-06-06 RX ORDER — ASPIRIN 325 MG
325 TABLET ORAL ONCE
Status: DISCONTINUED | OUTPATIENT
Start: 2022-06-06 | End: 2022-06-07 | Stop reason: HOSPADM

## 2022-06-06 RX ADMIN — IOPAMIDOL 120 ML: 755 INJECTION, SOLUTION INTRAVENOUS at 08:04

## 2022-06-06 RX ADMIN — SODIUM CHLORIDE, PRESERVATIVE FREE 10 ML: 5 INJECTION INTRAVENOUS at 06:15

## 2022-06-06 RX ADMIN — ACETAMINOPHEN 650 MG: 325 TABLET ORAL at 09:06

## 2022-06-06 RX ADMIN — Medication 25 MG: at 20:43

## 2022-06-06 RX ADMIN — SODIUM CHLORIDE, PRESERVATIVE FREE 10 ML: 5 INJECTION INTRAVENOUS at 20:42

## 2022-06-06 RX ADMIN — SODIUM CHLORIDE: 9 INJECTION, SOLUTION INTRAVENOUS at 06:14

## 2022-06-06 RX ADMIN — ROSUVASTATIN CALCIUM 20 MG: 20 TABLET, COATED ORAL at 20:50

## 2022-06-06 ASSESSMENT — PAIN DESCRIPTION - DESCRIPTORS: DESCRIPTORS: ACHING

## 2022-06-06 ASSESSMENT — PAIN DESCRIPTION - ORIENTATION: ORIENTATION: RIGHT

## 2022-06-06 ASSESSMENT — PAIN DESCRIPTION - LOCATION: LOCATION: WRIST

## 2022-06-06 ASSESSMENT — PAIN SCALES - GENERAL: PAINLEVEL_OUTOF10: 9

## 2022-06-06 NOTE — PROGRESS NOTES
1042  Pt admitted to  2E12 ambulatory for PCI. Pt NPO. Patient accompanied by son. Vital signs obtained. Assessment and data collection initiated. Oriented to room. Policies and procedures for 2E explained   All questions answered with no further questions at this time. Fall prevention and safety precautions discussed with patient. 2102  Pt to cath lab per bed  0812  Pt ret'd to 2E12 post PCI. Right radial site with vasc band and armboard. Pt reports discomfort in bone along the wrist, distal to site, of an \"8-9\" , \"aching\". Pt denies any CP. IV 0.09NS cont'd. Son at bedside. 0930  Pt taking diet/fluids - darryl well  1100  Pt assisted to bathroom to vd - darryl well. Ret'd to bed.    1327  Pt eating lunch. Son remains @ bedside.   Report given to Yvonne MAYFIELD

## 2022-06-06 NOTE — FLOWSHEET NOTE
168 [facility] care from [facility]. [phone] attempted to call report to 8B, RN is busy with a new admit and will call me back. 1430 reported off to [employer].   1459 transport here to take pt to [id number]

## 2022-06-06 NOTE — PROGRESS NOTES
Inpatient Cardiac Rehabilitation Consult    Received consult for Phase II Cardiac Rehabilitation. Patient needs cardiac rehab due to PCI on 6/6/22. Importance of Cardiac Rehab discussed with patient. Patient questions answered. Patient authorized for information to be sent to cardiac rehab at [facility]. [facility] in [place]. Cardiac Rehab brochure given.

## 2022-06-06 NOTE — PROCEDURES
[address] [place] [address]                            CARDIAC CATHETERIZATION    PATIENT NAME: Kishor Silva                       :        1960  MED REC NO:   [id number]                           ROOM:       0012  ACCOUNT NO:   [de-identified]                           ADMIT DATE: 2022  PROVIDER:     Arley Armendariz MD    DATE OF PROCEDURE:  2022    INDICATION:  This is a pleasant 57-year-old female patient who was  evaluated by Dr. Iain Dupont for preoperative risk assessment, dyspnea on  exertion, angina and angina equivalent symptoms. Stress test was  abnormal.  Left cardiac catheterization revealed intermediate LAD  disease, FFR was negative. RCA has  with left-to-right collaterals. There was evidence for severe left circumflex artery stenosis. Dr. Iain Dupont has cleared the case with Surgery and the patient presents for  PCI and stenting of the left circumflex artery. PROCEDURES PERFORMED:  1. Left cardiac catheterization with selective coronary angiogram.  2.  Successful PCI and stenting of the left circumflex artery. DESCRIPTION OF PROCEDURE:  After informed consent, the patient was  brought to the cardiac catheterization room. She was prepped and draped  in a sterile fashion. 2% lidocaine was injected in the skin and  subcutaneous tissue overlying the right radial artery. Under ultrasound  guidance using modified Seldinger technique, access was obtained in the  right radial artery. 5/6 Slender sheath was inserted. Standard  antithrombotic/antispasmodic medications were given. I initially tried  to use a 6-Lao EBU 3.5 guide catheter to cannulate the left main  coronary artery. There was difficulty with wiring. I switched the  guide catheter to 6-Lao JL3.5 guide catheter. Runthrough wire was  used to wire the LAD to stabilize the guide.   Fielder wire was used to  cross the lesion and wire the left circumflex artery. I then proceeded  with predilation angioplasty using a 2.5 x 6 mm PTCA balloon. Afterwards, I proceeded with the stenting. [place] 2.5 x 15 mm  drug-eluting stent was successfully deployed. This was postdilated  using a 3.0 mm x 8 mm noncompliant balloon. Wires were removed. Final  angiogram revealed well-expanded stent, 0% residual stenosis, JEET-3  flow. No complications including no dissection, distal embolization or  perforation. MEDICATIONS:  See MAR. COMPLICATIONS:  None. ESTIMATED BLOOD LOSS:  Less than 50 mL. ACCESS:  Right radial artery access. Vasc Band was applied. Hemostasis  was achieved. IMPRESSION:  Status post successful PCI and stenting of the left  circumflex artery. RECOMMENDATIONS:  The patient will be admitted to hospital for  observation. Monitor the patient on telemetry. Dual antiplatelet  therapy. She received loading dose of Plavix in the cath lab. Continue  Plavix 75 mg p.o. daily, aspirin 81 mg p.o. daily. High intensity  statin therapy. Continue on metoprolol. Outpatient followup with  primary cardiologist, Dr. Nile Miramontes.         Yajaira Lopez MD    D: 06/06/2022 8:19:24       T: 06/06/2022 8:22:09     [id number]  Job#: [id number]     Doc#: [id number]    CC:

## 2022-06-06 NOTE — H&P
[address] [address]  Sedation/Analgesia History & Physical    Pt Name: Brenda Soni  Account number: [de-identified]  MRN: [id number]  YOB: 1960  Provider Performing Procedure: Sussy Hernandez MD MD  Referring Provider: Sussy Hernandez MD   Primary Care Physician: Edwin Villegas MD  Date: 6/6/2022    PRE-PROCEDURE    Code Status: FULL CODE  Brief History/Pre-Procedure Diagnosis:   Abnormal stress test, CAD, Angina, JENKINS, preoperative assessment      Consent: : I have discussed with the patient risks, benefits, and alternatives (and relevant risks, benefits, and side effects related to alternatives or not receiving care), and likelihood of the success. The patient and/or representative appear to understand and agree to proceed. The discussion encompasses risks, benefits, and side effects related to the alternatives and the risks related to not receiving the proposed care, treatment, and services. The indication, risks and benefits of the procedure and possible therapeutic consequences and alternatives were discussed with the patient. The patient was given the opportunity to ask questions and to have them answered to his/her satisfaction. Risks of the procedure include but are not limited to the following: Bleeding, hematoma including retroperitoneal hemmorhage, infection, pain and discomfort, injury to the aorta and other blood vessels, rhythm disturbance, low blood pressure, myocardial infarction, stroke, kidney damage/failure, myocardial perforation, allergic reactions to sedatives/contrast material, loss of pulse/vascular compromise requiring surgery, aneurysm/pseudoaneurysm formation, possible loss of a limb/hand/leg, needing blood transfusion, requiring emergent open heart surgery or emergent coronary intervention, the need for intubation/respiratory support, the requirement for defibrillation/cardioversion, and death. Alternatives to and omission of the suggested procedure were discussed. The patient had no further questions and wished to proceed; the consent form was signed. MEDICAL HISTORY  []ASHD/ANGINA/MI/CHF   []Hypertension  []Diabetes  []Hyperlipidemia  []Smoking  []Family Hx of ASHD  []Additional information:       has a past medical history of Arthritis, Blood circulation, collateral, CAD (coronary artery disease), COPD (chronic obstructive pulmonary disease) ([address] Hyperlipidemia, and Hypertension. SURGICAL HISTORY   has a past surgical history that includes Tubal ligation. Additional information:       ALLERGIES   Allergies as of 06/06/2022    (No Known Allergies)     Additional information:       MEDICATIONS   Aspirin  [] 81 mg  [] 325 mg  [] None  Antiplatelet drug therapy use last 5 days  []No []Yes  Coumadin Use Last 5 Days []No []Yes  Other anticoagulant use last 5 days  []No []Yes    Current Facility-Administered Medications:     0.9 % sodium chloride infusion, , IntraVENous, Continuous, Kelsey Brown PA-C, Last Rate: 75 mL/hr at 06/06/22 0614, New Bag at 06/06/22 0614    aspirin tablet 325 mg, 325 mg, Oral, Once, Kelsey Stephanie PA-C    nitroGLYCERIN (NITROSTAT) SL tablet 0.4 mg, 0.4 mg, SubLINGual, Q5 Min PRN, Kelsey Brown PA-C    sodium chloride flush 0.9 % injection 5-40 mL, 5-40 mL, IntraVENous, PRN, Kelsey Brown, PA-C, 10 mL at 06/06/22 0615  Prior to Admission medications    Medication Sig Start Date End Date Taking?  Authorizing Provider   meloxicam (MOBIC) 15 MG tablet Take 15 mg by mouth daily   Yes Historical Provider, MD   VITAMIN D, CHOLECALCIFEROL, PO Take 5,000 Int'l Units by mouth daily   Yes Historical Provider, MD   aspirin 81 MG EC tablet Take by mouth    Historical Provider, MD   Calcium Carbonate-Vitamin D (CALCIUM-VITAMIN D) 600-125 MG-UNIT TABS Take by mouth    Historical Provider, MD   coenzyme Q10 100 MG CAPS capsule Take by mouth    Historical Provider, MD   VENTOLIN  (90 Base) MCG/ACT inhaler inhale 2 puffs by mouth and INTO THE LUNGS four times a day if needed for shortness of breath 4/27/22   Historical Provider, MD   metoprolol tartrate (LOPRESSOR) 25 MG tablet take 1 tablet by mouth twice a day 4/27/22   Historical Provider, MD   nicotine (NICODERM CQ) 7 MG/24HR APPLY 1 PATCH TO DRY, CLEAN, INTACT SKIN REMOVE AND REPLACE DAILY 2/21/22   Historical Provider, MD   omega-3 acid ethyl esters (LOVAZA) 1 g capsule take 2 capsules by mouth twice a day 4/7/22   Historical Provider, MD   rosuvastatin (CRESTOR) 20 MG tablet take 1 tablet by mouth once daily 4/6/22   Historical Provider, MD     Additional information:       VITAL SIGNS   Vitals:    06/06/22 0535   BP: (!) 132/55   Pulse:    Resp:    Temp:    SpO2:        PHYSICAL:   General: No acute distress  HEENT:  Unremarkable for age  Neck: without increased JVD, carotid pulses 2+ bilaterally without bruits  Heart: RRR, S1 & S2 WNL. No murmurs   Lungs: Clear to auscultation    Abdomen: BS present, without HSM, masses, or tenderness    Extremities: without C,C,E.  Pulses 2+ bilaterally   Mental Status: Alert & Oriented        PLANNED PROCEDURE   [x]Cath  [x]PCI                []Pacemaker/AICD  []DALILA             []Cardioversion []Peripheral angiography/PTA  []Other:      SEDATION  Planned agent:[x]Midazolam []Meperidine []Sublimaze []Morphine  []Diazepam  []Other:     ASA Classification:  []1 []2 [x]3 []4 []5   Class 1: A normal healthy patient  Class 2: Pt with mild to moderate systemic disease  Class 3: Severe systemic disease or disturbance  Class 4: Severe systemic disorders that are already life threatening. Class 5: Moribund pt with little chances of survival, for more than 24 hours. Mallampati I Airway Classification:   []1 [x]2 []3 []4     [x]Pre-procedure diagnostic studies complete and results available. Comment:    [x]Previous sedation/anesthesia experiences assessed.    Comment:    [x]The patient is an appropriate candidate to undergo the planned procedure sedation and anesthesia. (Refer to nursing sedation/analgesia documentation record)  [x]Formulation and discussion of sedation/procedure plan, risks, and expectations with patient and/or responsible adult completed. [x]Patient examined immediately prior to the procedure.  (Refer to nursing sedation/analgesia documentation record)      Marion Sesay MD, Chase Mak    Electronically signed 6/6/2022 at 7:06 AM

## 2022-06-06 NOTE — BRIEF OP NOTE
[address] [address]  Sedation/Analgesia Post Sedation Record    Pt Name: Irena Penn  Account number: [de-identified]  MRN: [id number]  YOB: 1960  Procedure Performed By: Bernadette Ma MD MD   Primary Care Physician: Genevieve Holloway MD  Date: 6/6/2022    POST-PROCEDURE    Physicians/Assistants: Bernadette Ma MD MD     Procedure Performed:Cath/ PCI      Sedation/Anesthesia: Versed/ Fentanyl and 2% xylocaine local anesthesia. Estimated Blood Loss: < 50 ml. Specimens Removed: None         Disposition of Specimen: N/A        Complications: No Immediate Complications. Post-procedure Diagnosis/Findings:        Successful PCI/BINTA of pLCX artery      Recommendations:  Bed rest for 4 hours post procedure   Admit overnight for observation post PCI   Monitor on Telemetry   Optimize medical therapy for Coronary Artery Disease  Aggressive risk factor modification   Access site care  Antiplatelet therapy: ASA 81 mg PO daily and Plavix 75 mg po daily   High intensity statin therapy  IVF: NS at 75 cc/h  AM Labs: BMP, CBC  Outpatient follow up in office in 2 weeks      Above findings and plan of care were discussed with patient and her family, questions were answered, agreeable with plan.        Bernadette Ma MD, Willi Wilhelm   Electronically signed 6/6/2022 at 8:09 AM  Interventional Cardiology

## 2022-06-07 VITALS
TEMPERATURE: 98 F | HEIGHT: 64 IN | HEART RATE: 65 BPM | BODY MASS INDEX: 22.53 KG/M2 | SYSTOLIC BLOOD PRESSURE: 141 MMHG | RESPIRATION RATE: 18 BRPM | OXYGEN SATURATION: 94 % | WEIGHT: 132 LBS | DIASTOLIC BLOOD PRESSURE: 77 MMHG

## 2022-06-07 LAB
ANION GAP SERPL CALCULATED.3IONS-SCNC: 9 MEQ/L (ref 8–16)
BUN BLDV-MCNC: 14 MG/DL (ref 7–22)
CALCIUM SERPL-MCNC: 9.2 MG/DL (ref 8.5–10.5)
CHLORIDE BLD-SCNC: 106 MEQ/L (ref 98–111)
CO2: 24 MEQ/L (ref 23–33)
CREAT SERPL-MCNC: 0.6 MG/DL (ref 0.4–1.2)
ERYTHROCYTE [DISTWIDTH] IN BLOOD BY AUTOMATED COUNT: 13.9 % (ref 11.5–14.5)
ERYTHROCYTE [DISTWIDTH] IN BLOOD BY AUTOMATED COUNT: 47.3 FL (ref 35–45)
GFR SERPL CREATININE-BSD FRML MDRD: > 90 ML/MIN/1.73M2
GLUCOSE BLD-MCNC: 88 MG/DL (ref 70–108)
HCT VFR BLD CALC: 40.9 % (ref 37–47)
HEMOGLOBIN: 13.1 GM/DL (ref 12–16)
MCH RBC QN AUTO: 29.6 PG (ref 26–33)
MCHC RBC AUTO-ENTMCNC: 32 GM/DL (ref 32.2–35.5)
MCV RBC AUTO: 92.5 FL (ref 81–99)
PLATELET # BLD: 187 THOU/MM3 (ref 130–400)
PMV BLD AUTO: 11.2 FL (ref 9.4–12.4)
POTASSIUM SERPL-SCNC: 4.5 MEQ/L (ref 3.5–5.2)
RBC # BLD: 4.42 MILL/MM3 (ref 4.2–5.4)
SODIUM BLD-SCNC: 139 MEQ/L (ref 135–145)
WBC # BLD: 8.8 THOU/MM3 (ref 4.8–10.8)

## 2022-06-07 PROCEDURE — 85027 COMPLETE CBC AUTOMATED: CPT

## 2022-06-07 PROCEDURE — 99238 HOSP IP/OBS DSCHRG MGMT 30/<: CPT | Performed by: NURSE PRACTITIONER

## 2022-06-07 PROCEDURE — 6370000000 HC RX 637 (ALT 250 FOR IP): Performed by: INTERNAL MEDICINE

## 2022-06-07 PROCEDURE — 2580000003 HC RX 258: Performed by: INTERNAL MEDICINE

## 2022-06-07 PROCEDURE — 80048 BASIC METABOLIC PNL TOTAL CA: CPT

## 2022-06-07 PROCEDURE — 36415 COLL VENOUS BLD VENIPUNCTURE: CPT

## 2022-06-07 RX ORDER — NITROGLYCERIN 0.4 MG/1
TABLET SUBLINGUAL
Qty: 25 TABLET | Refills: 1 | Status: SHIPPED | OUTPATIENT
Start: 2022-06-07

## 2022-06-07 RX ORDER — CLOPIDOGREL BISULFATE 75 MG/1
75 TABLET ORAL DAILY
Qty: 30 TABLET | Refills: 3 | Status: SHIPPED | OUTPATIENT
Start: 2022-06-08

## 2022-06-07 RX ADMIN — ASPIRIN 81 MG: 81 TABLET ORAL at 09:04

## 2022-06-07 RX ADMIN — CLOPIDOGREL BISULFATE 75 MG: 75 TABLET ORAL at 08:07

## 2022-06-07 RX ADMIN — SODIUM CHLORIDE, PRESERVATIVE FREE 10 ML: 5 INJECTION INTRAVENOUS at 09:04

## 2022-06-07 RX ADMIN — Medication 25 MG: at 09:04

## 2022-06-07 NOTE — PROGRESS NOTES
Cardiology Progress Note      Patient:  Mirta Troy  YOB: 1960  MRN: [id number]   Acct: [de-identified]  [address] [facility] Date:  6/6/2022  Primary Cardiologist: Dr. Joey Jaramillo    Chief Complaint:   Presented for OP elective PCI     Subjective (Events in last 24 hours):   Pt in bed   No chest pain or SOB     VSS  Tele SR no ectopy     RT radial cath site - no ecchymosis or hematoma Pulses present - neurovascular check WNL     Objective:   BP (!) 141/77   Pulse 65   Temp 98 °F (36.7 °C) (Oral)   Resp 18   Ht 5' 4\" (1.626 m)   Wt 132 lb (59.9 kg)   SpO2 94%   BMI 22.66 kg/m²        TELEMETRY: sr no ectopy     Physical Exam:  General Appearance: alert and oriented to person, place and time, in no acute distress  Cardiovascular: normal rate, regular rhythm, normal S1 and S2, no murmurs, rubs, clicks, or gallops, distal pulses intact,   Pulmonary/Chest: clear to auscultation bilaterally- no wheezes, rales or rhonchi, normal air movement, no respiratory distress  Abdomen: soft, non-tender, non-distended, normal bowel sounds, no masses Extremities: no cyanosis, clubbing or edema, pulses present    Skin: warm and dry, no rash or erythema  Musculoskeletal: normal range of motion, no joint swelling, deformity or tenderness  Neurological: alert, oriented, normal speech, no focal findings or movement disorder noted    Medications:    aspirin  325 mg Oral Once    aspirin  81 mg Oral Daily    rosuvastatin  20 mg Oral Nightly    metoprolol tartrate  25 mg Oral BID    sodium chloride flush  5-40 mL IntraVENous 2 times per day    clopidogrel  75 mg Oral Daily      sodium chloride 75 mL/hr at 06/06/22 8213    sodium chloride      sodium chloride       nitroGLYCERIN, 0.4 mg, Q5 Min PRN  sodium chloride flush, 5-40 mL, PRN  sodium chloride flush, 5-40 mL, PRN  sodium chloride, , PRN  acetaminophen, 650 mg, Q4H PRN        Diagnostics:  Left Heart Cath:   HEMODYNAMIC RESULTS AND LEFT VENTRICULOGRAM:  Left ventricular  end-diastolic pressure was 12 mmHg with no significant change before and  after contrast injection. No significant gradient across the aortic  valve to signify aortic stenosis. Left ventricular function was within  lower limits of normal with inferior basal hypokinesis. EF 50%.     CORONARY ARTERIOGRAM RESULTS:  1. Left main is patent, gives rise to left anterior descending and left  circumflex artery. 2.  Left anterior descending artery has diffuse calcific and  nonobstructive disease in the mid segment, could be 50% to 60% with  involvement of the diagonal artery. 3.  Left circumflex artery has ostial 50% stenosis with proximal 90%  stenosis. 4.  Right coronary artery is totally occluded and is dominant.     CONCLUSION:  1.  Multivessel coronary artery disease as described above. 2.  Mild LV dysfunction. 3.  No complications.     RECOMMENDATION:  At this point, this is a very complicated case. The  patient has significant carotid artery disease, is being evaluated for  surgical intervention by Dr. James Benjamin. She has a moderate lesion in the  LAD, severe disease in the RCA and left circ. I had an extensive  discussion with the patient's family as well as with interventional  team, Dr. Tru Alejo. Plan was to proceed with fractional flow reserve of  the LAD and then proceed accordingly. Consideration of  revascularization either way by percutaneous intervention or bypass  surgery will be discussed with the patient and we will discuss the  findings with Dr. James Benjamin as well.  Milly Darden M.D.  Odalis Klein: 05/23/2022      IMPRESSION:  Status post successful PCI and stenting of the left  circumflex artery.     RECOMMENDATIONS:  The patient will be admitted to hospital for  observation. Monitor the patient on telemetry. Dual antiplatelet  therapy. She received loading dose of Plavix in the cath lab. Continue  Plavix 75 mg p.o. daily, aspirin 81 mg p.o. daily. High intensity  statin therapy. Continue on metoprolol. Outpatient followup with  primary cardiologist, Dr. Ramírez Lilly MD  D: 06/06/2022     Lab Data:    Cardiac Enzymes:  No results for input(s): CKTOTAL, CKMB, CKMBINDEX, TROPONINI in the last 72 hours. CBC:   Lab Results   Component Value Date    WBC 8.8 06/07/2022    RBC 4.42 06/07/2022    HGB 13.1 06/07/2022    HCT 40.9 06/07/2022     06/07/2022       CMP:    Lab Results   Component Value Date     06/07/2022    K 4.5 06/07/2022     06/07/2022    CO2 24 06/07/2022    BUN 14 06/07/2022    CREATININE 0.6 06/07/2022    LABGLOM >90 06/07/2022    GLUCOSE 88 06/07/2022    CALCIUM 9.2 06/07/2022       Hepatic Function Panel:  No results found for: ALKPHOS, ALT, AST, PROT, BILITOT, BILIDIR, IBILI, LABALBU    Magnesium:  No results found for: MG    PT/INR:    Lab Results   Component Value Date    INR 1.00 06/06/2022       HgBA1c:  No results found for: LABA1C    FLP:    Lab Results   Component Value Date    TRIG 118 06/06/2022    HDL 53 06/06/2022    LDLCALC 83 06/06/2022       TSH:  No results found for: TSH      Assessment:    S\p OP elective cardiac cath 6/6/22: Status post successful PCI and stenting of the left circumflex artery.       HTN - stable   HLP   LDL 83    Carotid disease- for CEA with Dr. Sharyle Rainwater     Current daily smoker - cessation discussed         Plan:  · Home today   · Follow [facility] 2-4 weeks   · follow with Dr. Sharyle Rainwater for CEA        Cardiac Rehab: Yes    Discharge condition: stable  Disposition: Home  Time spent on discharge: less than 30 minutes       Discharge Medications for PCI/MI (performed or attempted):   · ASA: yes  · Statin: yes  · P2Y12 Inhibitor: yes  · Beta Blocker: yes  · ACE / ARB: no  · Nitro SL: yes      Discharge Medications for ICD, Cardiomyopathy, CHF:  · Beta Blocker: yes  · ACE Inhibitor/ARB: no         Electronically signed by PAUL العلي CNP on 6/7/2022 at 8:47 AM

## 2022-06-07 NOTE — PLAN OF CARE
Problem: Discharge Planning  Goal: Discharge to home or other facility with appropriate resources  Outcome: Progressing  Note: Discharge needs are assessed daily     Problem: ABCDS Injury Assessment  Goal: Absence of physical injury  Outcome: Progressing  Note: Patient free from injury this shift     Problem: Pain  Goal: Verbalizes/displays adequate comfort level or baseline comfort level  Outcome: Progressing  Note: Patient denies pain this shift     Problem: Cardiovascular - Adult  Goal: Maintains optimal cardiac output and hemodynamic stability  Outcome: Progressing  Note: Blood pressure WNL. Vitals monitored and recorded. Patient hemodynamically stable. Cardiac monitor in place with strips being read every four hours. Care plan reviewed with patient. Patient verbalizes understanding of the plan of care and contribute to goal setting.

## 2022-06-07 NOTE — CARE COORDINATION
6/7/22, 9:55 AM EDT  DISCHARGE PLANNING EVALUATION:    Landen Aguirre       Admitted: 6/6/2022/ [facility] day: 0   Location: 8B-30/030-A Reason for admit: Abnormal findings on cardiac catheterization [R93.1]  CAD in native artery [I25.10]   PMH:  has a past medical history of Arthritis, Blood circulation, collateral, CAD (coronary artery disease), COPD (chronic obstructive pulmonary disease) ([address] Hyperlipidemia, and Hypertension. Procedure:   6/6 Cath/PCI  Barriers to Discharge:  N/A discharging today. PCP: Anna Sandoval MD   %  Patient's Healthcare Decision Maker: Patient Declined (Legal Next of Kin Remains as Decision Maker)    Patient Goals/Plan/Treatment Preferences: Spoke with Chavo Javier, she plans to return home with her . She is independent and denies discharge needs. Transportation/Food Security/Housekeeping Addressed:  No issues identified. 6/7/22, 9:55 AM EDT    Patient goals/plan/ treatment preferences discussed by  and . Patient goals/plan/ treatment preferences reviewed with patient/ family. Patient/ family verbalize understanding of discharge plan and are in agreement with goal/plan/treatment preferences. Understanding was demonstrated using the teach back method. AVS provided by RN at time of discharge, which includes all necessary medical information pertaining to the patients current course of illness, treatment, post-discharge goals of care, and treatment preferences.      Services At/After Discharge: None

## 2022-06-07 NOTE — PLAN OF CARE
Problem: Discharge Planning  Goal: Discharge to home or other facility with appropriate resources  6/7/2022 0910 by Hugo Valerio RN  Outcome: Adequate for Discharge  Flowsheets (Taken 6/7/2022 0910)  Discharge to home or other facility with appropriate resources: Identify discharge learning needs (meds, wound care, etc)  Note: Educated pt about new med plavix and discharge instructions. 6/7/2022 0224 by Mark Anthony Cha RN  Outcome: Progressing  Note: Discharge needs are assessed daily     Problem: ABCDS Injury Assessment  Goal: Absence of physical injury  6/7/2022 0910 by Hugo Valerio RN  Outcome: Adequate for Discharge  Flowsheets (Taken 6/7/2022 0910)  Absence of Physical Injury: Implement safety measures based on patient assessment  Note: No injuries noted or reported. 6/7/2022 0224 by Mark Anthony Cha RN  Outcome: Progressing  Note: Patient free from injury this shift     Problem: Pain  Goal: Verbalizes/displays adequate comfort level or baseline comfort level  6/7/2022 0910 by Hugo Valerio RN  Outcome: Adequate for Discharge  Flowsheets (Taken 6/7/2022 0910)  Verbalizes/displays adequate comfort level or baseline comfort level: Encourage patient to monitor pain and request assistance  Note: Denies pain  6/7/2022 0224 by Mark Anthony Cha RN  Outcome: Progressing  Note: Patient denies pain this shift     Problem: Cardiovascular - Adult  Goal: Maintains optimal cardiac output and hemodynamic stability  6/7/2022 0910 by Hugo Valerio RN  Outcome: Adequate for Discharge  Flowsheets (Taken 6/7/2022 0910)  Maintains optimal cardiac output and hemodynamic stability: Monitor blood pressure and heart rate  Note: Vitals remain stable. 6/7/2022 0224 by Mark Anthony Cha RN  Outcome: Progressing  Note: Blood pressure WNL. Vitals monitored and recorded. Patient hemodynamically stable. Cardiac monitor in place with strips being read every four hours.

## 2022-06-20 ENCOUNTER — OFFICE VISIT (OUTPATIENT)
Dept: CARDIOLOGY CLINIC | Age: 62
End: 2022-06-20
Payer: COMMERCIAL

## 2022-06-20 VITALS
BODY MASS INDEX: 23.56 KG/M2 | DIASTOLIC BLOOD PRESSURE: 88 MMHG | HEIGHT: 64 IN | WEIGHT: 138 LBS | HEART RATE: 78 BPM | SYSTOLIC BLOOD PRESSURE: 160 MMHG

## 2022-06-20 DIAGNOSIS — I10 PRIMARY HYPERTENSION: ICD-10-CM

## 2022-06-20 DIAGNOSIS — I25.10 CORONARY ARTERY DISEASE INVOLVING NATIVE CORONARY ARTERY OF NATIVE HEART WITHOUT ANGINA PECTORIS: Primary | ICD-10-CM

## 2022-06-20 PROCEDURE — G8420 CALC BMI NORM PARAMETERS: HCPCS | Performed by: NUCLEAR MEDICINE

## 2022-06-20 PROCEDURE — 3017F COLORECTAL CA SCREEN DOC REV: CPT | Performed by: NUCLEAR MEDICINE

## 2022-06-20 PROCEDURE — 4004F PT TOBACCO SCREEN RCVD TLK: CPT | Performed by: NUCLEAR MEDICINE

## 2022-06-20 PROCEDURE — 99214 OFFICE O/P EST MOD 30 MIN: CPT | Performed by: NUCLEAR MEDICINE

## 2022-06-20 PROCEDURE — G8427 DOCREV CUR MEDS BY ELIG CLIN: HCPCS | Performed by: NUCLEAR MEDICINE

## 2022-06-20 RX ORDER — ATORVASTATIN CALCIUM 40 MG/1
40 TABLET, FILM COATED ORAL DAILY
COMMUNITY

## 2022-06-20 NOTE — PROGRESS NOTES
[id number] [facility] [address]  [address]  [facility] [address]  Dept: [phone]  Dept Fax: [phone]  Loc: [phone]    Visit Date: 6/20/2022    Charles Morgan is a 59 y.o. female who presents todayfor:  Chief Complaint   Patient presents with    Follow-up    Coronary Artery Disease    Hypertension    Hyperlipidemia   had Left circ stent  Some issues with meds and COMPLIANCE  Does have carotid disease as well  Smoking , still smoking   No chest pain  Some baseline dyspnea  BP is stable   Higher today   On statins for hyperlipidemia  Doing fair     HPI:  HPI  Past Medical History:   Diagnosis Date    Arthritis     Blood circulation, collateral     CAD (coronary artery disease)     COPD (chronic obstructive pulmonary disease) ([facility] [address]     Hyperlipidemia     Hypertension       Past Surgical History:   Procedure Laterality Date    TUBAL LIGATION       Family History   Problem Relation Age of Onset    Heart Disease Mother     Stroke Mother     Heart Attack Mother     Heart Attack Father     Epilepsy Sister     Heart Attack Brother     Arthritis Sister     No Known Problems Sister     Arthritis Sister      Social History     Tobacco Use    Smoking status: Current Some Day Smoker     Packs/day: 0.25     Start date: 1976    Smokeless tobacco: Never Used   Substance Use Topics    Alcohol use: Yes     Comment: occasionally      Current Outpatient Medications   Medication Sig Dispense Refill    atorvastatin (LIPITOR) 40 MG tablet Take 40 mg by mouth daily      nitroGLYCERIN (NITROSTAT) 0.4 MG SL tablet up to max of 3 total doses.  If no relief after 1 dose, call 911. 25 tablet 1    clopidogrel (PLAVIX) 75 MG tablet Take 1 tablet by mouth daily 30 tablet 3    VITAMIN D, CHOLECALCIFEROL, PO Take 5,000 Int'l Units by mouth daily      aspirin 81 MG EC tablet Take by mouth      Calcium Carbonate-Vitamin D (CALCIUM-VITAMIN D) 600-125 MG-UNIT TABS Take by mouth      coenzyme Q10 100 MG CAPS capsule Take by mouth      VENTOLIN  (90 Base) MCG/ACT inhaler inhale 2 puffs by mouth and INTO THE LUNGS four times a day if needed for shortness of breath      metoprolol tartrate (LOPRESSOR) 25 MG tablet take 1 tablet by mouth twice a day      omega-3 acid ethyl esters (LOVAZA) 1 g capsule take 2 capsules by mouth twice a day      rosuvastatin (CRESTOR) 20 MG tablet take 1 tablet by mouth once daily      nicotine (NICODERM CQ) 7 MG/24HR APPLY 1 PATCH TO DRY, CLEAN, INTACT SKIN REMOVE AND REPLACE DAILY (Patient not taking: Reported on 6/20/2022)       No current facility-administered medications for this visit.      No Known Allergies  Health Maintenance   Topic Date Due    Pneumococcal 0-64 years Vaccine (1 - PCV) Never done    Depression Screen  Never done    HIV screen  Never done    Hepatitis C screen  Never done    DTaP/Tdap/Td vaccine (1 - Tdap) Never done    Cervical cancer screen  Never done    Colorectal Cancer Screen  Never done    Breast cancer screen  Never done    Shingles vaccine (1 of 2) Never done    COVID-19 Vaccine (3 - Booster for Pfizer series) 01/01/2022    Flu vaccine (Season Ended) 09/01/2022    Lipids  06/06/2023    Hepatitis A vaccine  Aged Out    Hepatitis B vaccine  Aged Out    Hib vaccine  Aged Out    Meningococcal (ACWY) vaccine  Aged Out       Subjective:  Review of Systems  General:   No fever, no chills, No fatigue or weight loss  Pulmonary:    No dyspnea, no wheezing  Cardiac:    Denies recent chest pain,   GI:     No nausea or vomiting, no abdominal pain  Neuro:    No dizziness or light headedness,   Musculoskeletal:  No recent active issues  Extremities:   No edema, no obvious claudication       Objective:  Physical Exam  BP (!) 160/88   Pulse 78   Ht 5' 4\" (1.626 m)   Wt 138 lb (62.6 kg)   BMI 23.69 kg/m²   General:   Well developed, well nourished  Lungs:   Clear to auscultation  Heart:    Normal S1 S2, Slight murmur. no rubs, no gallops  Abdomen:   Soft, non tender, no organomegalies, positive bowel sounds  Extremities:   No edema, no cyanosis, good peripheral pulses  Neurological:   Awake, alert, oriented. No obvious focal deficits  Musculoskelatal:  No obvious deformities    Assessment:      Diagnosis Orders   1. Coronary artery disease involving native coronary artery of native heart without angina pectoris     2. Primary hypertension     as above  Higher risk patient  COMPLIANCE issues  Smoking: discussed with the patient the importance of smoke cessation especially with the risk of CAD. Plan:  No follow-ups on file. As above  We clarified meds at length   We discussed statins  Discussed smoking  Continue risk factor modification and medical management  Thank you for allowing me to participate in the care of your patient. Please don't hesitate to contact me regarding any further issues related to the patient care    Orders Placed:  No orders of the defined types were placed in this encounter. Medications Prescribed:  No orders of the defined types were placed in this encounter. Discussed use, benefit, and side effects of prescribed medications. All patient questions answered. Pt voicedunderstanding. Instructed to continue current medications, diet and exercise. Continue risk factor modification and medical management. Patient agreed with treatment plan. Follow up as directed.     Electronically signedby Rosa Del Rosario MD on 6/20/2022 at 11:14 AM

## 2022-08-08 ENCOUNTER — TELEPHONE (OUTPATIENT)
Dept: CARDIOLOGY CLINIC | Age: 62
End: 2022-08-08